# Patient Record
Sex: MALE | Race: WHITE
[De-identification: names, ages, dates, MRNs, and addresses within clinical notes are randomized per-mention and may not be internally consistent; named-entity substitution may affect disease eponyms.]

---

## 2019-01-14 ENCOUNTER — HOSPITAL ENCOUNTER (EMERGENCY)
Dept: HOSPITAL 96 - M.ERS | Age: 54
Discharge: HOME | End: 2019-01-14
Payer: COMMERCIAL

## 2019-01-14 VITALS — BODY MASS INDEX: 19.29 KG/M2 | WEIGHT: 120 LBS | HEIGHT: 66 IN

## 2019-01-14 VITALS — DIASTOLIC BLOOD PRESSURE: 91 MMHG | SYSTOLIC BLOOD PRESSURE: 156 MMHG
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
## 2019-01-14 DIAGNOSIS — S00.33XA: Primary | ICD-10-CM

## 2019-01-14 DIAGNOSIS — F15.10: ICD-10-CM
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
## 2019-01-14 DIAGNOSIS — J34.0: ICD-10-CM

## 2019-01-14 DIAGNOSIS — Z95.5: ICD-10-CM
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
## 2019-01-14 DIAGNOSIS — E11.9: ICD-10-CM

## 2019-01-14 LAB — AMP/METHAMP: POSITIVE

## 2019-12-12 ENCOUNTER — HOSPITAL ENCOUNTER (INPATIENT)
Dept: HOSPITAL 96 - M.ERS | Age: 54
LOS: 1 days | Discharge: HOME | DRG: 313 | End: 2019-12-13
Attending: FAMILY MEDICINE | Admitting: FAMILY MEDICINE
Payer: COMMERCIAL

## 2019-12-12 VITALS — HEIGHT: 67.99 IN | BODY MASS INDEX: 17.58 KG/M2 | WEIGHT: 116 LBS

## 2019-12-12 VITALS — SYSTOLIC BLOOD PRESSURE: 150 MMHG | DIASTOLIC BLOOD PRESSURE: 82 MMHG

## 2019-12-12 VITALS — DIASTOLIC BLOOD PRESSURE: 93 MMHG | SYSTOLIC BLOOD PRESSURE: 170 MMHG

## 2019-12-12 VITALS — DIASTOLIC BLOOD PRESSURE: 90 MMHG | SYSTOLIC BLOOD PRESSURE: 165 MMHG

## 2019-12-12 DIAGNOSIS — F17.220: ICD-10-CM

## 2019-12-12 DIAGNOSIS — Z83.3: ICD-10-CM

## 2019-12-12 DIAGNOSIS — E11.65: ICD-10-CM

## 2019-12-12 DIAGNOSIS — Z79.4: ICD-10-CM

## 2019-12-12 DIAGNOSIS — Z79.82: ICD-10-CM

## 2019-12-12 DIAGNOSIS — Z91.19: ICD-10-CM

## 2019-12-12 DIAGNOSIS — Z23: ICD-10-CM

## 2019-12-12 DIAGNOSIS — E87.1: ICD-10-CM

## 2019-12-12 DIAGNOSIS — I25.10: ICD-10-CM

## 2019-12-12 DIAGNOSIS — Z79.899: ICD-10-CM

## 2019-12-12 DIAGNOSIS — F15.10: ICD-10-CM

## 2019-12-12 DIAGNOSIS — R07.89: Primary | ICD-10-CM

## 2019-12-12 DIAGNOSIS — E78.5: ICD-10-CM

## 2019-12-12 DIAGNOSIS — Z95.5: ICD-10-CM

## 2019-12-12 LAB
ABSOLUTE BASOPHILS: 0 THOU/UL (ref 0–0.2)
ABSOLUTE EOSINOPHILS: 0.1 THOU/UL (ref 0–0.7)
ABSOLUTE MONOCYTES: 0.4 THOU/UL (ref 0–1.2)
ALBUMIN SERPL-MCNC: 4.2 G/DL (ref 3.4–5)
ALP SERPL-CCNC: 201 U/L (ref 46–116)
ALT SERPL-CCNC: 20 U/L (ref 30–65)
AMP/METHAMP: POSITIVE
ANION GAP SERPL CALC-SCNC: 10 MMOL/L (ref 7–16)
APAP SERPL-MCNC: < 2 UG/ML (ref 10–30)
AST SERPL-CCNC: 16 U/L (ref 15–37)
BASOPHILS NFR BLD AUTO: 0.6 %
BILIRUB SERPL-MCNC: 0.5 MG/DL
BUN SERPL-MCNC: 16 MG/DL (ref 7–18)
CALCIUM SERPL-MCNC: 10 MG/DL (ref 8.5–10.1)
CHLORIDE SERPL-SCNC: 95 MMOL/L (ref 98–107)
CO2 SERPL-SCNC: 27 MMOL/L (ref 21–32)
CREAT SERPL-MCNC: 1 MG/DL (ref 0.6–1.3)
EOSINOPHIL NFR BLD: 0.9 %
GLUCOSE SERPL-MCNC: 513 MG/DL (ref 70–99)
GRANULOCYTES NFR BLD MANUAL: 70.6 %
HCT VFR BLD CALC: 46.6 % (ref 42–52)
HGB BLD-MCNC: 16 GM/DL (ref 14–18)
INR PPP: 1
LIPASE: 96 U/L (ref 73–393)
LYMPHOCYTES # BLD: 1.3 THOU/UL (ref 0.8–5.3)
LYMPHOCYTES NFR BLD AUTO: 20.7 %
MCH RBC QN AUTO: 28.9 PG (ref 26–34)
MCHC RBC AUTO-ENTMCNC: 34.4 G/DL (ref 28–37)
MCV RBC: 83.9 FL (ref 80–100)
MONOCYTES NFR BLD: 7.2 %
MPV: 8 FL. (ref 7.2–11.1)
NEUTROPHILS # BLD: 4.4 THOU/UL (ref 1.6–8.1)
NT-PRO BRAIN NAT PEPTIDE: 67 PG/ML (ref ?–300)
NUCLEATED RBCS: 0 /100WBC
PLATELET COUNT*: 342 THOU/UL (ref 150–400)
POTASSIUM SERPL-SCNC: 4.1 MMOL/L (ref 3.5–5.1)
PROT SERPL-MCNC: 8.5 G/DL (ref 6.4–8.2)
PROTHROMBIN TIME: 10.4 SECONDS (ref 9.2–11.5)
RBC # BLD AUTO: 5.55 MIL/UL (ref 4.5–6)
RDW-CV: 13.6 % (ref 10.5–14.5)
SALICYLATES SERPL-MCNC: < 2.8 MG/DL (ref 2.8–20)
SODIUM SERPL-SCNC: 132 MMOL/L (ref 136–145)
WBC # BLD AUTO: 6.2 THOU/UL (ref 4–11)

## 2019-12-13 VITALS — DIASTOLIC BLOOD PRESSURE: 74 MMHG | SYSTOLIC BLOOD PRESSURE: 133 MMHG

## 2019-12-13 VITALS — SYSTOLIC BLOOD PRESSURE: 123 MMHG | DIASTOLIC BLOOD PRESSURE: 71 MMHG

## 2019-12-13 VITALS — SYSTOLIC BLOOD PRESSURE: 150 MMHG | DIASTOLIC BLOOD PRESSURE: 84 MMHG

## 2019-12-13 VITALS — SYSTOLIC BLOOD PRESSURE: 136 MMHG | DIASTOLIC BLOOD PRESSURE: 80 MMHG

## 2019-12-13 VITALS — SYSTOLIC BLOOD PRESSURE: 119 MMHG | DIASTOLIC BLOOD PRESSURE: 69 MMHG

## 2019-12-13 NOTE — EXE
[place] [address]
Phone:  [phone]                     STRESS ECHOCARDIOGRAM         
_______________________________________________________________________________
 
Name:         SIVAROQUE DELGADO            Room:          [facility] [place] 
..#:    [id number]     Account #:     [id number]  
Admission:    12/12/19    Attend Phys:   Estela craft Sa
Discharge:                Date of Birth: [birth year]  
Date of Service: 12/13/19 1531  Report #:      [id number]
        [id number]
_______________________________________________________________________________
THIS REPORT FOR:  //name//                      
 
 
--------------- APPROVED REPORT --------------
 
 
Study performed:  12/13/2019 14:21:53
 
Exam:  Stress Echocardiogram
Indication: Chest pain
Patient Location: In-Patient
Stress Nurse: Gretta Quintero RN
Room #: [facility]
Supervising Physician: Carlos Bui MD
Status: routine
 
Ht: 5 ft 8 in      
HR: 101 bpm       BP: 107/65 mmHg 
Rhythm: NSR    
 
Medical History
Medical History: CAD s/p MI
Allergies: No known drug allergies
Cardiac Risk Factors: HTN, DM, Hyperlipidemia, Tobacco History 
(Current/Recent), PVD
Previous Cardiac Procedures: PCI
 
Procedure
The patient underwent an Exercise Stress Test using the Brendan 
Protocol. Blood pressure, heart rate, and EKG were monitored.
An Echocardiogram was performed by technician in four stages in quad 
fashion.  At peak stress, four selected images were obtained and 
placed side by side with resting images for comparison.
 
Stress Test Details
Stress Test:  Exercise stress testing was performed using a Brendan 
protocol.      
HR           
Resting HR:            101 bpm   Max Heart Rate (APMHR): 166 bpm 
 
Max HR Achieved:  126 bpm   Target HR (85% APMHR): 141 bpm  
% of APMHR:         75         
Recovery HR:            97 bpm        
HR response to stress: Blunted HR response to stress      
 
BP           
 
 
[place] [address]
Phone:  [phone]                     STRESS ECHOCARDIOGRAM         
_______________________________________________________________________________
 
Name:         ROQUE PANIAGUA            Room:          [address]#:    [id number]     Account #:     [id number]  
Admission:    12/12/19    Attend Phys:   Estela craft 
Discharge:                Date of Birth: [birth year]  
Date of Service: 12/13/19 1531  Report #:      [id number]
        [id number]
_______________________________________________________________________________
Resting BP:  107/65 mmHg        
Max BP:       196/54 mmHg        
Recovery BP:       107/63 mmHg        
BP response to stress: Normal blood pressure response to 
stress.      
ECG           
Resting ECG:  Inferior MI pattern       
Stress ECG:     Inferior MI pattern       
ST Change: Normal         
Maximum ST Deviation: 0 mm        
Arrhythmia:    None         
Recovery ECG: Inferior MI pattern       
Recovery ST Change: Normal        
Recovery ST Deviation: 0 mm        
Recovery Arrhythmia: None        
 
Clinical
Reason for Termination: Maximal effort
Exercise duration: 7 min 6 sec
Highest Stage Achieved: Stage 3: 3.4 mph at 14% grade. 
Exercise capacity: 8.71 METs
 
Pre-Stress Echo
The resting Echocardiogram showed normal left ventricular 
contractility with an estimated Ejection Fraction of about 60-65%. 
 
Post-Stress Echo
The stress Echocardiogram showed normal left ventricular 
contractility with an estimated Ejection Fraction of about >70%. 
Compared to rest, there were no stress-induced wall motion 
abnormalities. 
 
Conclusion
Clinical Response:  Non-ischemic
Exercise Capacity:  Below Average
Stress ECG Response:  Equivocal
Stress Echo Images:  Non-ischemic
Suboptimal exercise stress echo for predicting future cardiac events 
secondary to inability to achieve target heart rate.
 
Other Information
Study Quality: Good
 
<Conclusion>
 
 
[place] [address]
Phone:  [phone]                     STRESS ECHOCARDIOGRAM         
_______________________________________________________________________________
 
Name:         ROQUE PANIAGUA            Room:          [address]#:    [id number]     Account #:     [id number]  
Admission:    12/12/19    Attend Phys:   Estela craft Sa
Discharge:                Date of Birth: [birth year]  
Date of Service: 12/13/19 1531  Report #:      [id number]
        [id number]
_______________________________________________________________________________
Suboptimal exercise stress echo for predicting future cardiac events 
secondary to inability to achieve target heart rate.
 
 
 
 
 
 
 
 
 
 
 
 
 
 
 
 
 
 
 
 
 
 
 
 
 
 
 
 
 
 
 
 
 
 
 
 
 
 
 
 
 
 
  <ELECTRONICALLY SIGNED>
                                           By: Carlos Bui MD, FACC      
  12/13/19     1531
D: 12/13/19 1531   _____________________________________
T: 12/13/19 1531   Carlos Bui MD, FACC        /INF

## 2019-12-13 NOTE — EKG
[place] [address]
Phone:  [phone]                     ELECTROCARDIOGRAM REPORT      
_______________________________________________________________________________
 
Name:       ROQUE PANIAGUA             Room:           [address]    ADM [place]  
M.R.#:  [id number]      Account #:      [id number]  
Admission:  19     Attend Phys:    Estela Alejandre
Discharge:               Date of Birth:  [birth year]  
         Report #: [id number]
    [id number]
_______________________________________________________________________________
THIS REPORT FOR:  //name//                      
 
                         [facility] ED
                                       
Test Date:    2019               Test Time:    17:51:16
Pat Name:     ROQUE PANIAGUA         Department:   
Patient ID:   [id number]            Room:         [facility]
Gender:       M                        Technician:   
:          1965               Requested By: Yg Alexander
Order Number: [id number] MD:   Carlos Bui
                                 Measurements
Intervals                              Axis          
Rate:         109                      P:            81
AK:           164                      QRS:          268
QRSD:         85                       T:            60
QT:           325                                    
QTc:          438                                    
                           Interpretive Statements
Sinus tachycardia
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
Lateral leads are also involved
No previous ECG available for comparison
 
Electronically Signed On 2019 10:30:46 CST by Carlos Bui
[email or web address]
 
 
 
 
 
 
 
 
 
 
 
 
 
 
 
 
 
 
  <ELECTRONICALLY SIGNED>
                                           By: Carlos Bui MD, [facility]      
  19     1030
D: 19 1751   _____________________________________
T: 19 175   Carlos Bui MD, FACC        /EPI

## 2019-12-13 NOTE — NUR
ASSUMED CARE OF PT THIS AM AROUND 0715- CARDIAC MONITOR IN PLACE AS ORDERED,
TRACING SR- UPON ASSESSMENT PT NOTED TO BE RESTING IN BED- PT A&O X4-
CONTINENT OF B/B- UP AD-IRENA IN ROOM, STEADY GAIT NOTED- LCTA, RESP EVEN AND
UN-LABORED- VSS, O2 SAT 99% ON RA- ABD SOFT/FLAT/NON-TENDER, BS X4 QUADS- LAST
BM REPORTED X3 DAYS AGO- IV NOTED TO RIGHT FA INTACT, IVF INFUSSING AS
PRESCRIBED- PT CURRENTLY NPO PENDING CARDIO CONSULT THIS AM, BS MONITORED AS
ORDERED, INSULIN HELD THIS AM R/T NPO STATUS- PT DENIES ANY C/O CHEST PAIN- PT
NOTES SINUS PRESSURE THIS > ON LEFT SIDE THIS AM- CALL LIGHT AND PERSONAL
BELONGINGS WITH IN REACH-HOURLY ROUNDS IN PLACE R/T SAFETY/NEEDS- PT MAKES
NEEDS KNOWN- ALL NEEDS MET AT THIS TIME-WCTM

## 2019-12-13 NOTE — CON
[address]  [address]                    CONSULTATION                  
_______________________________________________________________________________
 
Name:       SIVAROQUE DELGADO             Room:           [address] [place]  
M.R.#:  [id number]      Account #:      [id number]  
Admission:  12/12/19     Attend Phys:    Estela Alejandre
Discharge:               Date of Birth:  [birth year]  
         Report #: [id number]
                                                                     [id number]  
_______________________________________________________________________________
THIS REPORT FOR:  //name//                      
 
CC: RACHAEL physician/PCP
    Estela Evans
 
DATE OF SERVICE:  12/13/2019
 
 
CARDIOLOGY CONSULTATION
 
HISTORY OF PRESENT ILLNESS:  The patient is a 54-year-old single white male who
I was asked to see in the hospital today after he complained of chest pain.  The
patient has a long past medical history.  He has a long history of diabetes and
hyperlipidemia.  He apparently had 2 coronary stents placed in [place]
back in 2014.  He stays fairly active.  Unfortunately, he has no medical
insurance and ran out of his medications recently.  Yesterday, he apparently was
detained for failing to pay for speeding ticket.  He was placed in retention
center.  In [facility] center, he complained of chest pain.  He was noted to have
an elevated blood sugar.  He was sent by ambulance to the Emergency Room here at
[facility] and admitted.  He described the pain as a sharp pain.  He did note
some shortness of breath.  Denied the pain radiating into his arms.  There was
no associated nausea.  He does complain of exertional dyspnea, but has had no
edema.  He does have a cough.  Denied any palpitation or syncope.
 
PAST MEDICAL HISTORY:  He has had no major surgical procedures.  His previous
medications included insulin, Lipitor, aspirin, although he quit taking all the
medications.
 
FAMILY HISTORY:  Negative for heart disease.
 
SOCIAL HISTORY:  He lives with a friend in [place].  He works as a cabinet
maker.  No smoking or alcohol abuse.  He has used meth in the past as well as
marijuana.  No IV drugs.
 
REVIEW OF SYSTEMS:  No history of stroke, asthma, peptic ulcer disease, liver
disease, kidney disease, cancer, psychiatric illness and chronic skin condition.
 
PHYSICAL EXAMINATION:
GENERAL:  Revealed a young white male, who appeared in no distress.
VITAL SIGNS:  He had a blood pressure of 150/80, pulse is 90, he is afebrile.
HEENT:  He was anicteric.  Conjunctivae pink.  Mucous membranes moist.
NECK:  Veins do not appear distended.
CHEST:  Clear to auscultation.
CARDIOVASCULAR:  Regular rate and rhythm.
ABDOMEN:  Soft.
EXTREMITIES:  Had no edema.  Posterior tibial pulse 2+ bilaterally.
 
 
 
[place] [address]                    CONSULTATION                  
_______________________________________________________________________________
 
Name:       ROQUE PANIAGUA             Room:           [address]#:  [id number]      Account #:      [id number]  
Admission:  12/12/19     Attend Phys:    Estela Alejandre
Discharge:               Date of Birth:  [birth year]  
         Report #: [id number]
                                                                     [id number]  
_______________________________________________________________________________
SKIN:  Warm and dry.
NEUROLOGIC:  Nonfocal.
LYMPH:  No adenopathy.
MUSCULOSKELETAL:  No joint effusion.
 
RADIOLOGICAL DATA:  His ECG on admission showed a sinus rhythm, evidence of
previous inferior infarction, but no acute ST or T-wave changes.  His workup in
the Emergency Room last night, he had a portable chest x-ray that showed normal
heart size and clear lung fields.
 
LABORATORY DATA:  His lab work in the Emergency Room last night was sodium 132,
creatinine 1.0, glucose is 513.  His SGPT 20.  Troponin 0.06.  His urine drug
screen was positive for methamphetamines.  White blood cell count 6.2,
hemoglobin 16.
 
IMPRESSION AND RECOMMENDATIONS:
1.  Chest pain.  Atypical for angina.  In light of his previous history of
coronary stenting, recommend dobutamine echocardiogram.
2.  Diabetes.  Blood sugar was noted to be elevated.  The patient stopped taking
his insulin.
3.  Hyperlipidemia.  The patient is no longer on a statin drug.
4.  History of methamphetamine abuse.
 
 
 
 
 
 
 
 
 
 
 
 
 
 
 
 
 
 
 
 
 
 
<ELECTRONICALLY SIGNED>
                                        By:  Carlos Bui MD, FACC      
12/13/19     1621
D: 12/13/19 0855_______________________________________
T: 12/13/19 0922Dahyun Bui MD, FACC         /nt

## 2019-12-13 NOTE — NUR
Pt is A&O. Resides at home with a friend. Pt states that he does work, but
states that he is currently laid off. Hx of meth use. Consult recieved
regarding Pt's inability to afford insulin, informed of cheapest option at
[employer], CM to discuss with . YOLANDE provided Pt with safety net clinic info sho
encouraged Pt to f/u at [facility]. Pt voiced understanding. Following.

## 2019-12-13 NOTE — NUR
PATIENT PROGRESSING TOWARDS GOALS: DENIES PAIN AND DISCOMFORT THIS SHIFT. ON
ROOM AIR, VSS. PATIENT WITHDRAWN AND PROVIDED MINIMAL ANSWERS TO QUESTIONS.
TWO KNIVES AND CHEWING TOBACCO SENT TO SECURITY UPON ADMISSION. NPO FOR
CARDIOLOGY THIS AM. CALL LIGHT WITHIN REACH

## 2019-12-14 LAB
EST. AVERAGE GLUCOSE BLD GHB EST-MCNC: 329 MG/DL
GLYCOHEMOGLOBIN (HGB A1C): 13.1 % (ref 4.8–5.6)

## 2021-06-06 ENCOUNTER — HOSPITAL ENCOUNTER (EMERGENCY)
Dept: HOSPITAL 96 - M.ERS | Age: 56
Discharge: ANOTHER HOSPITAL | End: 2021-06-06
Payer: COMMERCIAL

## 2021-06-06 VITALS — HEIGHT: 66 IN | WEIGHT: 139.99 LBS | BODY MASS INDEX: 22.5 KG/M2

## 2021-06-06 VITALS — SYSTOLIC BLOOD PRESSURE: 142 MMHG | DIASTOLIC BLOOD PRESSURE: 92 MMHG

## 2021-06-06 DIAGNOSIS — I21.4: Primary | ICD-10-CM

## 2021-06-06 DIAGNOSIS — E11.9: ICD-10-CM

## 2021-06-06 DIAGNOSIS — Z79.4: ICD-10-CM

## 2021-06-06 DIAGNOSIS — Z95.1: ICD-10-CM

## 2021-06-06 DIAGNOSIS — Z95.5: ICD-10-CM

## 2021-06-06 LAB
ABSOLUTE BASOPHILS: 0 THOU/UL (ref 0–0.2)
ABSOLUTE EOSINOPHILS: 0.2 THOU/UL (ref 0–0.7)
ABSOLUTE MONOCYTES: 0.6 THOU/UL (ref 0–1.2)
ALBUMIN SERPL-MCNC: 3.3 G/DL (ref 3.4–5)
ALP SERPL-CCNC: 154 U/L (ref 46–116)
ALT SERPL-CCNC: 14 U/L (ref 30–65)
ANION GAP SERPL CALC-SCNC: 16 MMOL/L (ref 7–16)
APTT BLD: 25.2 SECONDS (ref 25–31.3)
AST SERPL-CCNC: 45 U/L (ref 15–37)
BASOPHILS NFR BLD AUTO: 0.2 %
BILIRUB SERPL-MCNC: 0.5 MG/DL
BUN SERPL-MCNC: 10 MG/DL (ref 7–18)
CALCIUM SERPL-MCNC: 8.7 MG/DL (ref 8.5–10.1)
CHLORIDE SERPL-SCNC: 95 MMOL/L (ref 98–107)
CK-MB MASS: 17.5 NG/ML
CO2 SERPL-SCNC: 23 MMOL/L (ref 21–32)
CREAT SERPL-MCNC: 0.8 MG/DL (ref 0.6–1.3)
EOSINOPHIL NFR BLD: 2 %
GLUCOSE SERPL-MCNC: 355 MG/DL (ref 70–99)
GRANULOCYTES NFR BLD MANUAL: 70.6 %
HCT VFR BLD CALC: 35.2 % (ref 42–52)
HGB BLD-MCNC: 12 GM/DL (ref 14–18)
INR PPP: 1.1
LIPASE: 84 U/L (ref 73–393)
LYMPHOCYTES # BLD: 1.5 THOU/UL (ref 0.8–5.3)
LYMPHOCYTES NFR BLD AUTO: 19.2 %
MAGNESIUM SERPL-MCNC: 1.5 MG/DL (ref 1.8–2.4)
MCH RBC QN AUTO: 28.4 PG (ref 26–34)
MCHC RBC AUTO-ENTMCNC: 34.2 G/DL (ref 28–37)
MCV RBC: 83.1 FL (ref 80–100)
MONOCYTES NFR BLD: 8 %
MPV: 7.7 FL. (ref 7.2–11.1)
NEUTROPHILS # BLD: 5.5 THOU/UL (ref 1.6–8.1)
NT-PRO BRAIN NAT PEPTIDE: 1211 PG/ML (ref ?–300)
NUCLEATED RBCS: 0 /100WBC
PLATELET COUNT*: 364 THOU/UL (ref 150–400)
POTASSIUM SERPL-SCNC: 3.2 MMOL/L (ref 3.5–5.1)
PROT SERPL-MCNC: 7.4 G/DL (ref 6.4–8.2)
PROTHROMBIN TIME: 11.7 SECONDS (ref 9.2–11.5)
RBC # BLD AUTO: 4.23 MIL/UL (ref 4.5–6)
RDW-CV: 14.3 % (ref 10.5–14.5)
SODIUM SERPL-SCNC: 134 MMOL/L (ref 136–145)
WBC # BLD AUTO: 7.7 THOU/UL (ref 4–11)

## 2021-06-07 NOTE — EKG
[place] [address]
Phone:  [phone]                     ELECTROCARDIOGRAM REPORT      
_______________________________________________________________________________
 
Name:         ROQUE PANIAGUA            Room:                     [facility]#:    [id number]     Account #:     [id number]  
Admission:    21    Attend Phys:                     
Discharge:    21    Date of Birth: [birth year]  
Date of Service: 21 1507  Report #:      [id number]
        [id number]
_______________________________________________________________________________
THIS REPORT FOR:  //name//                      
 
                         [facility] ED
                                       
Test Date:    2021               Test Time:    15:07:03
Pat Name:     ROQUE PANIAGUA         Department:   
Patient ID:   [id number]            Room:          
Gender:                               Technician:   [facility]
:          1965               Requested By: Wisam Man
Order Number: [id number] MD:   Skyler Saenz
                                 Measurements
Intervals                              Axis          
Rate:         125                      P:            78
RI:           163                      QRS:          229
QRSD:         92                       T:            -30
QT:           291                                    
QTc:          420                                    
                           Interpretive Statements
Sinus tachycardia
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
Consider anterior infarct
Compared to ECG 2019 17:51:16
No significant changes
Electronically Signed On 2021 13:52:38 CDT by Skyler Saenz
[email or web address]
 
 
 
 
 
 
 
 
 
 
 
 
 
 
 
 
 
 
 
  <ELECTRONICALLY SIGNED>
                                           By: Skyler Saenz MD, FACC     
  21     1352
D: 21 1507   _____________________________________
T: 21 1507   Skyler Saenz MD, [facility]       /EPI